# Patient Record
Sex: MALE | Race: WHITE
[De-identification: names, ages, dates, MRNs, and addresses within clinical notes are randomized per-mention and may not be internally consistent; named-entity substitution may affect disease eponyms.]

---

## 2017-02-17 ENCOUNTER — HOSPITAL ENCOUNTER (INPATIENT)
Dept: HOSPITAL 74 - YASAS | Age: 22
LOS: 5 days | Discharge: HOME | DRG: 897 | End: 2017-02-22
Attending: INTERNAL MEDICINE | Admitting: INTERNAL MEDICINE
Payer: COMMERCIAL

## 2017-02-17 VITALS — BODY MASS INDEX: 24.7 KG/M2

## 2017-02-17 DIAGNOSIS — F11.23: Primary | ICD-10-CM

## 2017-02-17 DIAGNOSIS — F12.20: ICD-10-CM

## 2017-02-17 DIAGNOSIS — F10.20: ICD-10-CM

## 2017-02-17 DIAGNOSIS — F19.280: ICD-10-CM

## 2017-02-17 DIAGNOSIS — Z87.898: ICD-10-CM

## 2017-02-17 DIAGNOSIS — J06.9: ICD-10-CM

## 2017-02-17 DIAGNOSIS — I45.10: ICD-10-CM

## 2017-02-17 DIAGNOSIS — F17.210: ICD-10-CM

## 2017-02-17 DIAGNOSIS — Z86.79: ICD-10-CM

## 2017-02-17 DIAGNOSIS — G47.00: ICD-10-CM

## 2017-02-17 DIAGNOSIS — F13.20: ICD-10-CM

## 2017-02-17 LAB
APPEARANCE UR: CLEAR
BILIRUB UR STRIP.AUTO-MCNC: NEGATIVE MG/DL
COLOR UR: YELLOW
KETONES UR QL STRIP: NEGATIVE
LEUKOCYTE ESTERASE UR QL STRIP.AUTO: NEGATIVE
NITRITE UR QL STRIP: NEGATIVE
PH UR: 8 [PH] (ref 5–8)
PROT UR QL STRIP: NEGATIVE
PROT UR QL STRIP: NEGATIVE
RBC # UR STRIP: NEGATIVE /UL
SP GR UR: 1.03 (ref 1–1.03)
UROBILINOGEN UR STRIP-MCNC: NEGATIVE E.U./DL (ref 0.2–1)

## 2017-02-17 PROCEDURE — HZ2ZZZZ DETOXIFICATION SERVICES FOR SUBSTANCE ABUSE TREATMENT: ICD-10-PCS | Performed by: INTERNAL MEDICINE

## 2017-02-17 RX ADMIN — NICOTINE SCH MG: 21 PATCH TRANSDERMAL at 14:27

## 2017-02-17 RX ADMIN — ZOLPIDEM TARTRATE PRN MG: 5 TABLET, COATED ORAL at 22:08

## 2017-02-17 RX ADMIN — NICOTINE POLACRILEX PRN MG: 4 GUM, CHEWING ORAL at 15:22

## 2017-02-17 RX ADMIN — NICOTINE POLACRILEX PRN MG: 4 GUM, CHEWING ORAL at 17:27

## 2017-02-17 RX ADMIN — HYDROXYZINE PAMOATE PRN MG: 25 CAPSULE ORAL at 15:11

## 2017-02-17 RX ADMIN — Medication SCH MG: at 22:08

## 2017-02-18 LAB
ALBUMIN SERPL-MCNC: 4.5 G/DL (ref 3.4–5)
ALP SERPL-CCNC: 89 U/L (ref 45–117)
ALT SERPL-CCNC: 25 U/L (ref 12–78)
ANION GAP SERPL CALC-SCNC: 9 MMOL/L (ref 8–16)
AST SERPL-CCNC: 24 U/L (ref 15–37)
BILIRUB SERPL-MCNC: 0.5 MG/DL (ref 0.2–1)
CALCIUM SERPL-MCNC: 9.6 MG/DL (ref 8.5–10.1)
CO2 SERPL-SCNC: 32 MMOL/L (ref 21–32)
CREAT SERPL-MCNC: 1 MG/DL (ref 0.7–1.3)
DEPRECATED RDW RBC AUTO: 13.4 % (ref 11.9–15.9)
GLUCOSE SERPL-MCNC: 87 MG/DL (ref 74–106)
HIV 1 & 2 AB: NEGATIVE
HIV 1 AGP24: NEGATIVE
MCH RBC QN AUTO: 28.9 PG (ref 25.7–33.7)
MCHC RBC AUTO-ENTMCNC: 33.7 G/DL (ref 32–35.9)
MCV RBC: 85.7 FL (ref 80–96)
PLATELET # BLD AUTO: 224 K/MM3 (ref 134–434)
PMV BLD: 9.1 FL (ref 7.5–11.1)
PROT SERPL-MCNC: 7.1 G/DL (ref 6.4–8.2)
WBC # BLD AUTO: 9 K/MM3 (ref 4–10)

## 2017-02-18 RX ADMIN — NICOTINE POLACRILEX PRN MG: 4 GUM, CHEWING ORAL at 10:28

## 2017-02-18 RX ADMIN — CYCLOBENZAPRINE HYDROCHLORIDE PRN MG: 10 TABLET, FILM COATED ORAL at 12:58

## 2017-02-18 RX ADMIN — Medication SCH TAB: at 10:21

## 2017-02-18 RX ADMIN — NICOTINE POLACRILEX PRN MG: 4 GUM, CHEWING ORAL at 07:38

## 2017-02-18 RX ADMIN — Medication SCH MG: at 22:05

## 2017-02-18 RX ADMIN — NICOTINE SCH MG: 21 PATCH TRANSDERMAL at 10:21

## 2017-02-18 RX ADMIN — NICOTINE POLACRILEX PRN MG: 4 GUM, CHEWING ORAL at 13:00

## 2017-02-18 RX ADMIN — ZOLPIDEM TARTRATE PRN MG: 5 TABLET, COATED ORAL at 22:05

## 2017-02-18 RX ADMIN — HYDROXYZINE PAMOATE PRN MG: 25 CAPSULE ORAL at 12:58

## 2017-02-18 RX ADMIN — CYCLOBENZAPRINE HYDROCHLORIDE PRN MG: 10 TABLET, FILM COATED ORAL at 22:05

## 2017-02-19 RX ADMIN — Medication SCH TAB: at 10:14

## 2017-02-19 RX ADMIN — CYCLOBENZAPRINE HYDROCHLORIDE PRN MG: 10 TABLET, FILM COATED ORAL at 22:03

## 2017-02-19 RX ADMIN — Medication SCH MG: at 22:03

## 2017-02-19 RX ADMIN — ZOLPIDEM TARTRATE PRN MG: 5 TABLET, COATED ORAL at 22:03

## 2017-02-19 RX ADMIN — NICOTINE SCH MG: 21 PATCH TRANSDERMAL at 10:13

## 2017-02-20 RX ADMIN — NICOTINE SCH MG: 21 PATCH TRANSDERMAL at 10:28

## 2017-02-20 RX ADMIN — HYDROXYZINE PAMOATE PRN MG: 25 CAPSULE ORAL at 14:32

## 2017-02-20 RX ADMIN — CYCLOBENZAPRINE HYDROCHLORIDE PRN MG: 10 TABLET, FILM COATED ORAL at 22:06

## 2017-02-20 RX ADMIN — Medication SCH MG: at 22:06

## 2017-02-20 RX ADMIN — NICOTINE POLACRILEX PRN MG: 4 GUM, CHEWING ORAL at 17:35

## 2017-02-20 RX ADMIN — ZOLPIDEM TARTRATE PRN MG: 5 TABLET, COATED ORAL at 22:06

## 2017-02-20 RX ADMIN — HYDROXYZINE PAMOATE PRN MG: 25 CAPSULE ORAL at 22:06

## 2017-02-20 RX ADMIN — Medication SCH TAB: at 10:25

## 2017-02-21 RX ADMIN — HYDROXYZINE PAMOATE PRN MG: 25 CAPSULE ORAL at 02:57

## 2017-02-21 RX ADMIN — HYDROXYZINE PAMOATE PRN MG: 25 CAPSULE ORAL at 10:10

## 2017-02-21 RX ADMIN — NICOTINE POLACRILEX PRN MG: 4 GUM, CHEWING ORAL at 12:21

## 2017-02-21 RX ADMIN — NICOTINE SCH MG: 21 PATCH TRANSDERMAL at 10:10

## 2017-02-21 RX ADMIN — HYDROXYZINE PAMOATE PRN MG: 25 CAPSULE ORAL at 13:55

## 2017-02-21 RX ADMIN — Medication SCH MG: at 22:04

## 2017-02-21 RX ADMIN — Medication SCH TAB: at 10:09

## 2017-02-21 RX ADMIN — ZOLPIDEM TARTRATE PRN MG: 5 TABLET, COATED ORAL at 22:04

## 2017-02-21 RX ADMIN — CYCLOBENZAPRINE HYDROCHLORIDE PRN MG: 10 TABLET, FILM COATED ORAL at 10:10

## 2017-02-21 RX ADMIN — CYCLOBENZAPRINE HYDROCHLORIDE PRN MG: 10 TABLET, FILM COATED ORAL at 22:04

## 2017-02-22 VITALS — SYSTOLIC BLOOD PRESSURE: 106 MMHG | TEMPERATURE: 97 F | DIASTOLIC BLOOD PRESSURE: 57 MMHG | HEART RATE: 100 BPM

## 2020-08-28 ENCOUNTER — EMERGENCY (EMERGENCY)
Facility: HOSPITAL | Age: 25
LOS: 1 days | Discharge: HOME | End: 2020-08-28
Attending: EMERGENCY MEDICINE | Admitting: EMERGENCY MEDICINE
Payer: SELF-PAY

## 2020-08-28 VITALS
TEMPERATURE: 98 F | RESPIRATION RATE: 18 BRPM | HEIGHT: 70 IN | OXYGEN SATURATION: 97 % | HEART RATE: 91 BPM | SYSTOLIC BLOOD PRESSURE: 139 MMHG | WEIGHT: 182.98 LBS | DIASTOLIC BLOOD PRESSURE: 78 MMHG
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
## 2020-08-28 DIAGNOSIS — S61.211A LACERATION WITHOUT FOREIGN BODY OF LEFT INDEX FINGER WITHOUT DAMAGE TO NAIL, INITIAL ENCOUNTER: ICD-10-CM
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
## 2020-08-28 PROCEDURE — 12001 RPR S/N/AX/GEN/TRNK 2.5CM/<: CPT

## 2020-08-28 PROCEDURE — 99282 EMERGENCY DEPT VISIT SF MDM: CPT | Mod: 25

## 2020-08-28 PROCEDURE — 99283 EMERGENCY DEPT VISIT LOW MDM: CPT | Mod: 25

## 2020-08-28 NOTE — ED PROVIDER NOTE - PATIENT PORTAL LINK FT
You can access the FollowMyHealth Patient Portal offered by [facility] by registering at the following website: [email or web address]. By joining [facility] FollowMyHealth portal, you will also be able to view your health information using other applications (apps) compatible with our system.

## 2020-08-28 NOTE — ED PROVIDER NOTE - ATTENDING CONTRIBUTION TO CARE
Patient in ED w concern for lac to elft index finger sustained while at work.  Notes he cut finger on piece of metal.  Tetanus UTD.  No numbness/weakness.  On my face to face ED eval, pateint is non toxic.  2 cm laceration to left index over PIP, ROM intact, strength 5/5, sensation intact distally.  Cap refill <5 sec.  Will repair and dispo accordingly with wound care instructions.

## 2020-08-28 NOTE — ED PROVIDER NOTE - OBJECTIVE STATEMENT
26 y/o healthy M presents to the ED c/o lac to L index finger at the PIP joint. Pt says he cut himself on a piece of metal at work 1 H PTA. Denies N/T/W. His last tetanus was 3 Y ago. 24 y/o healthy M presents to the ED c/o lac to L index finger at the PIP joint. Pt says he accidentally cut himself on a piece of metal at work 1 H PTA. Denies N/T/W. His last tetanus was 3 Y ago. pt notes washed wound prior to arrival. no further complaints.

## 2020-08-28 NOTE — ED PROVIDER NOTE - NEUROLOGICAL, MLM
Alert and oriented, no focal deficits, no motor or sensory deficits. Strength 5/5. Distal neurovascularly intact.

## 2020-08-28 NOTE — ED PROVIDER NOTE - NSFOLLOWUPINSTRUCTIONS_ED_ALL_ED_FT
Follow up in the Emergency Department in 10 days for suture removal          Care For Your Stitches    WHAT YOU NEED TO KNOW:    Stitches, or sutures, are used to close cuts and wounds on the skin. Stitches need to be removed after your wound has healed.         DISCHARGE INSTRUCTIONS:    Return to the emergency department if:     Your stitches come apart.      Blood soaks through your bandages.      You suddenly cannot move your injured joint.      You have sudden numbness around your wound.      You see red streaks coming from your wound.    Contact your healthcare provider if:     You have a fever and chills.      Your wound is red, warm, swollen, or leaking pus.      There is a bad smell coming from your wound.      You have increased pain in the wound area.      You have questions or concerns about your condition or care.    Care for your stitches:     Protect the stitches. You may need to cover your stitches with a bandage for 24 to 48 hours, or as directed. Do not bump or hit the suture area. This could open the wound. Do not trim or shorten the ends of your stitches. If they rub on your clothing, put a gauze bandage between the stitches and your clothes.      Clean the area as directed. Carefully wash your wound with soap and water. For mouth and lip wounds, rinse your mouth after meals and at bedtime. Ask your healthcare provider what to use to rinse your mouth. If you have a scalp wound, you may gently wash your hair every 2 days with mild shampoo. Do not use hair products, such as hair spray. Check your wound for signs of infection when you clean it. Signs include redness, swelling, and pus.      Keep the area dry as directed. Wait 12 to 24 hours after you receive your stitches before you take a shower. Take showers instead of baths. Do not take a bath or swim. Your healthcare provider will give you instructions for bathing with your stitches.    Help your wound heal:     Elevate your wound. Keep your wound above the level of your heart as often as you can. This will help decrease swelling and pain. Prop the area on pillows or blankets, if possible, to keep it elevated comfortably.      Limit activity. Do not stretch the skin around your wound. This will help prevent bleeding and swelling.    Follow up with your healthcare provider as directed: You may need to return to have your stitches removed. Write down your questions so you remember to ask them during your visits.        © Copyright [employer] 2020       back to top                      © Copyright [employer] 2020

## 2020-08-28 NOTE — ED PROVIDER NOTE - SKIN, MLM
2cm lac to subcutaneous tissue over the L index PIP. No active bleeding. FROM. 2cm lac to subcutaneous tissue over the L index PIP. No active bleeding. FROM. Strength 5/5 flexion and extension. no visible FB. distal NVI. no edema or bruising.

## 2020-08-28 NOTE — ED PROVIDER NOTE - CLINICAL SUMMARY MEDICAL DECISION MAKING FREE TEXT BOX
lac to left index finger. neurovascular intact. no evidence of tendon injury on exam. lac repaired. wound care d/w pt. f/u in 10 days for suture removal. return precautions d/w pt.

## 2021-11-18 ENCOUNTER — HOSPITAL ENCOUNTER (EMERGENCY)
Dept: HOSPITAL 74 - FER | Age: 26
Discharge: HOME | End: 2021-11-18
Payer: COMMERCIAL

## 2021-11-18 VITALS — TEMPERATURE: 98.2 F | DIASTOLIC BLOOD PRESSURE: 50 MMHG | HEART RATE: 53 BPM | SYSTOLIC BLOOD PRESSURE: 103 MMHG

## 2021-11-18 VITALS — BODY MASS INDEX: 26.5 KG/M2
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
## 2021-11-18 DIAGNOSIS — R07.89: Primary | ICD-10-CM

## 2021-11-18 PROCEDURE — 3E0233Z INTRODUCTION OF ANTI-INFLAMMATORY INTO MUSCLE, PERCUTANEOUS APPROACH: ICD-10-PCS

## 2023-01-28 ENCOUNTER — HOSPITAL ENCOUNTER (EMERGENCY)
Dept: HOSPITAL 74 - FER | Age: 28
Discharge: HOME | End: 2023-01-28
Payer: COMMERCIAL

## 2023-01-28 VITALS
TEMPERATURE: 98.6 F | SYSTOLIC BLOOD PRESSURE: 106 MMHG | DIASTOLIC BLOOD PRESSURE: 56 MMHG | HEART RATE: 56 BPM | RESPIRATION RATE: 16 BRPM

## 2023-01-28 VITALS — BODY MASS INDEX: 26.5 KG/M2
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
## 2023-01-28 DIAGNOSIS — S22.32XA: Primary | ICD-10-CM
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
## 2023-01-28 PROCEDURE — 3E0233Z INTRODUCTION OF ANTI-INFLAMMATORY INTO MUSCLE, PERCUTANEOUS APPROACH: ICD-10-PCS

## 2023-05-07 ENCOUNTER — HOSPITAL ENCOUNTER (EMERGENCY)
Dept: HOSPITAL 74 - FER | Age: 28
Discharge: HOME | End: 2023-05-07
Payer: COMMERCIAL

## 2023-05-07 VITALS
SYSTOLIC BLOOD PRESSURE: 123 MMHG | HEART RATE: 69 BPM | TEMPERATURE: 97.9 F | RESPIRATION RATE: 20 BRPM | DIASTOLIC BLOOD PRESSURE: 74 MMHG

## 2023-05-07 VITALS — BODY MASS INDEX: 26.5 KG/M2
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
## 2023-05-07 DIAGNOSIS — S80.811A: Primary | ICD-10-CM

## 2023-05-07 PROCEDURE — 3E0234Z INTRODUCTION OF SERUM, TOXOID AND VACCINE INTO MUSCLE, PERCUTANEOUS APPROACH: ICD-10-PCS

## 2024-01-03 PROBLEM — Z00.00 ENCOUNTER FOR PREVENTIVE HEALTH EXAMINATION: Status: ACTIVE | Noted: 2024-01-03

## 2024-01-04 ENCOUNTER — APPOINTMENT (OUTPATIENT)
Dept: COLORECTAL SURGERY | Facility: CLINIC | Age: 29
End: 2024-01-04
Payer: COMMERCIAL

## 2024-01-04 VITALS
WEIGHT: 189 LBS | BODY MASS INDEX: 27.06 KG/M2 | SYSTOLIC BLOOD PRESSURE: 100 MMHG | HEART RATE: 82 BPM | HEIGHT: 70 IN | TEMPERATURE: 98 F | RESPIRATION RATE: 18 BRPM | OXYGEN SATURATION: 99 % | DIASTOLIC BLOOD PRESSURE: 60 MMHG

## 2024-01-04 DIAGNOSIS — K61.2 ANORECTAL ABSCESS: ICD-10-CM

## 2024-01-04 DIAGNOSIS — K62.89 OTHER SPECIFIED DISEASES OF ANUS AND RECTUM: ICD-10-CM

## 2024-01-04 DIAGNOSIS — Z87.891 PERSONAL HISTORY OF NICOTINE DEPENDENCE: ICD-10-CM

## 2024-01-04 DIAGNOSIS — K60.0 ACUTE ANAL FISSURE: ICD-10-CM

## 2024-01-04 PROCEDURE — 99204 OFFICE O/P NEW MOD 45 MIN: CPT

## 2024-01-04 NOTE — CONSULT LETTER
[Dear  ___] : Dear  [unfilled], [Consult Letter:] : I had the pleasure of evaluating your patient, [unfilled]. [Please see my note below.] : Please see my note below. [Consult Closing:] : Thank you very much for allowing me to participate in the care of this patient.  If you have any questions, please do not hesitate to contact me. [Sincerely,] : Sincerely, [FreeTextEntry2] : Otilio Aldana MD [FreeTextEntry1] : I will return to you with the results of upcoming surgical management [FreeTextEntry3] : Mark Barnett MD FASCRS

## 2024-01-04 NOTE — ASSESSMENT
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  The anatomy is such that I am recommending surgical management.  I do not think that the application of ointments or an antibiotic will bring all of the various anatomical findings into long-lasting resolution.  I discussed with the patient surgical management of the anal fissure fistula abscess complex.  I anticipate a subcutaneous fistulotomy which will not put the anal sphincter muscle complex under risk.  Additionally the thrombosis of the right posterior external hemorrhoid will be dealt with if it is still present.  I discussed with the patient that I will not be performing hemorrhoidectomy.  Risks and benefits discussed with the patient.  Written information on the anatomy and diagnosis provided to patient.  Prescription for compounded nifedipine ointment given to [facility].  Patient given written instructions on using the ointment in order to begin the process of healing the anal fissure. Surgical scheduling underway.

## 2024-01-04 NOTE — HISTORY OF PRESENT ILLNESS
[FreeTextEntry1] : 28-year-old male presented 2 weeks ago to the urgent care center with acute onset of a painful anal lump.  He was given suppositories which she has been using.  He reports some improvement however there continues to be a persistent lump and discomfort in the area.  He also reports that approximately 3 weeks ago he presented to the emergent care setting with a perianal painful swelling.  He reports drainage of an abscess.  The patient denies previous history of similar issues.

## 2024-01-04 NOTE — PHYSICAL EXAM
[FreeTextEntry1] : Anorectal examination includes visual, digital rectal exam.  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  There appears to be a subcutaneous fistula leading out to a left posterior perianal previous abscess, which was drained at the urgent care center.  Additionally there is a right posterior small thrombosis of the external hemorrhoid, probably associated to the original fissure fistula abscess complex due to proximity.

## 2024-01-31 ENCOUNTER — TRANSCRIPTION ENCOUNTER (OUTPATIENT)
Age: 29
End: 2024-01-31

## 2024-01-31 ENCOUNTER — APPOINTMENT (OUTPATIENT)
Dept: COLORECTAL SURGERY | Facility: HOSPITAL | Age: 29
End: 2024-01-31
Payer: COMMERCIAL

## 2024-01-31 PROCEDURE — 46275 REMOVE ANAL FIST INTER: CPT

## 2024-01-31 RX ORDER — TRAMADOL HYDROCHLORIDE 50 MG/1
50 TABLET, COATED ORAL 3 TIMES DAILY
Qty: 8 | Refills: 0 | Status: ACTIVE | COMMUNITY
Start: 2024-01-31 | End: 1900-01-01

## 2024-02-22 ENCOUNTER — APPOINTMENT (OUTPATIENT)
Dept: COLORECTAL SURGERY | Facility: CLINIC | Age: 29
End: 2024-02-22

## 2024-02-27 ENCOUNTER — APPOINTMENT (OUTPATIENT)
Dept: COLORECTAL SURGERY | Facility: CLINIC | Age: 29
End: 2024-02-27
Payer: COMMERCIAL

## 2024-02-27 VITALS
TEMPERATURE: 98 F | DIASTOLIC BLOOD PRESSURE: 50 MMHG | WEIGHT: 186 LBS | SYSTOLIC BLOOD PRESSURE: 88 MMHG | HEART RATE: 94 BPM | RESPIRATION RATE: 18 BRPM | BODY MASS INDEX: 26.69 KG/M2 | OXYGEN SATURATION: 98 %

## 2024-02-27 DIAGNOSIS — K60.5 ANORECTAL FISTULA: ICD-10-CM

## 2024-02-27 PROCEDURE — 99024 POSTOP FOLLOW-UP VISIT: CPT

## 2024-02-27 NOTE — ASSESSMENT
[FreeTextEntry1] : Mr. Fong is doing well following surgical management.  We discussed ongoing activity issues and diet.  He is invited to return should he have any associated anorectal issues in the future.

## 2024-02-27 NOTE — PHYSICAL EXAM
[FreeTextEntry1] : Anorectal examination shows the left posterior lateral fistulotomy site to be healing without complication.  No evidence of

## 2024-02-27 NOTE — HISTORY OF PRESENT ILLNESS
[FreeTextEntry1] : Mr. Fong returns following surgical management of an anal fissure fistula, flex, January 31.  He reports today complete resolution of the preoperative infection associated pain he was experiencing.  He is not having any issues of bleeding.

## 2024-05-28 ENCOUNTER — HOSPITAL ENCOUNTER (EMERGENCY)
Dept: HOSPITAL 74 - FER | Age: 29
Discharge: HOME | End: 2024-05-28
Payer: COMMERCIAL

## 2024-05-28 VITALS
HEART RATE: 86 BPM | DIASTOLIC BLOOD PRESSURE: 71 MMHG | TEMPERATURE: 98.1 F | RESPIRATION RATE: 20 BRPM | SYSTOLIC BLOOD PRESSURE: 117 MMHG

## 2024-05-28 VITALS — BODY MASS INDEX: 27.2 KG/M2
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
## 2024-05-28 DIAGNOSIS — S93.401A: Primary | ICD-10-CM

## 2024-05-28 RX ADMIN — IBUPROFEN ONE MG: 600 TABLET, FILM COATED ORAL at 18:41

## 2025-01-24 NOTE — ED ADULT NURSE NOTE - PAIN RATING/NUMBER SCALE (0-10): REST
Called and left message with Dr. Orellana's information of taking OTC till he is able to refill. Insurance will not cover.   
Patient's wife Sonya called and stated he lost the esomeprazole (NexIUM) 40 MG capsule  medication that was refilled on 1//10/2024. She is requesting to have a new prescription sent to the [facility] - [place], [place] - [address]   
Spouse and patient called refill line again to check status of request. Advised in process awaiting approval  
4